# Patient Record
Sex: MALE | Race: WHITE | NOT HISPANIC OR LATINO | Employment: OTHER | URBAN - NONMETROPOLITAN AREA
[De-identification: names, ages, dates, MRNs, and addresses within clinical notes are randomized per-mention and may not be internally consistent; named-entity substitution may affect disease eponyms.]

---

## 2017-11-29 PROBLEM — Z87.442 PERSONAL HISTORY OF URINARY CALCULI: Status: ACTIVE | Noted: 2017-11-29

## 2017-11-29 PROBLEM — R33.9 URINARY RETENTION: Status: ACTIVE | Noted: 2017-11-29

## 2017-11-29 PROBLEM — R31.0 GROSS HEMATURIA: Status: ACTIVE | Noted: 2017-11-29

## 2020-08-11 PROBLEM — H25.812 COMBINED FORM OF AGE-RELATED CATARACT, LEFT EYE: Status: ACTIVE | Noted: 2020-08-11

## 2021-03-29 PROBLEM — H40.1131 PRIMARY OPEN ANGLE GLAUCOMA (POAG) OF BOTH EYES, MILD STAGE: Status: ACTIVE | Noted: 2021-03-29

## 2022-09-02 ENCOUNTER — HOSPITAL ENCOUNTER (EMERGENCY)
Facility: HOSPITAL | Age: 72
Discharge: HOME | End: 2022-09-02
Attending: STUDENT IN AN ORGANIZED HEALTH CARE EDUCATION/TRAINING PROGRAM
Payer: MEDICARE

## 2022-09-02 VITALS
DIASTOLIC BLOOD PRESSURE: 73 MMHG | TEMPERATURE: 97 F | HEIGHT: 64 IN | HEART RATE: 77 BPM | BODY MASS INDEX: 30.22 KG/M2 | SYSTOLIC BLOOD PRESSURE: 122 MMHG | WEIGHT: 177 LBS | RESPIRATION RATE: 16 BRPM | OXYGEN SATURATION: 96 %

## 2022-09-02 VITALS
SYSTOLIC BLOOD PRESSURE: 127 MMHG | HEART RATE: 95 BPM | TEMPERATURE: 100 F | WEIGHT: 177 LBS | RESPIRATION RATE: 18 BRPM | HEIGHT: 64 IN | BODY MASS INDEX: 30.22 KG/M2 | OXYGEN SATURATION: 97 % | DIASTOLIC BLOOD PRESSURE: 75 MMHG

## 2022-09-02 DIAGNOSIS — M54.9 BACK PAIN, UNSPECIFIED BACK LOCATION, UNSPECIFIED BACK PAIN LATERALITY, UNSPECIFIED CHRONICITY: Primary | ICD-10-CM

## 2022-09-02 PROCEDURE — 63600175 PHARM REV CODE 636 W HCPCS: Performed by: STUDENT IN AN ORGANIZED HEALTH CARE EDUCATION/TRAINING PROGRAM

## 2022-09-02 PROCEDURE — 99282 EMERGENCY DEPT VISIT SF MDM: CPT | Mod: 27

## 2022-09-02 PROCEDURE — 99284 EMERGENCY DEPT VISIT MOD MDM: CPT

## 2022-09-02 PROCEDURE — 96372 THER/PROPH/DIAG INJ SC/IM: CPT | Performed by: STUDENT IN AN ORGANIZED HEALTH CARE EDUCATION/TRAINING PROGRAM

## 2022-09-02 RX ORDER — MORPHINE SULFATE 2 MG/ML
2 INJECTION, SOLUTION INTRAMUSCULAR; INTRAVENOUS
Status: COMPLETED | OUTPATIENT
Start: 2022-09-02 | End: 2022-09-02

## 2022-09-02 RX ADMIN — MORPHINE SULFATE 2 MG: 2 INJECTION, SOLUTION INTRAMUSCULAR; INTRAVENOUS at 03:09

## 2022-09-02 NOTE — ED PROVIDER NOTES
"  History  Chief Complaint   Patient presents with    Back Pain     Pt with complaint of chronic back pain.     71-year-old male just seen in the ED presented again to his back pain started does reoccur once he got home.  Again patient with no bowel or bladder incontinence.  No saddle anesthesia.  No gait abnormality.  No systemic symptoms to indicate infection.      Past Medical History:   Diagnosis Date    Acute urinary retention     Back pain     Hematuria     High cholesterol     Hypertension     Renal stone     bladder cyst       Past Surgical History:   Procedure Laterality Date    CATARACT EXTRACTION      CATARACT EXTRACTION W/  INTRAOCULAR LENS IMPLANT Left 8/11/2020    Procedure: EXTRACTION, CATARACT, WITH IOL INSERTION;  Surgeon: Davy Celestin MD;  Location: [facility];  Service: Ophthalmology;  Laterality: Left;    dental extraction      SHOULDER SURGERY Left     spur       Family History   Problem Relation Age of Onset    Heart attack Mother     Cancer Father        Social History     Tobacco Use    Smoking status: Never    Smokeless tobacco: Never   Substance Use Topics    Alcohol use: No    Drug use: No       ROS  Review of Systems   Constitutional:  Negative for fever.   HENT:  Negative for congestion.    Eyes:  Negative for visual disturbance.   Respiratory:  Negative for cough and shortness of breath.    Cardiovascular:  Negative for chest pain.   Gastrointestinal:  Negative for abdominal pain, nausea and vomiting.   Genitourinary:  Negative for dysuria.   Musculoskeletal:  Positive for back pain. Negative for arthralgias.   Skin:  Negative for color change.   Allergic/Immunologic: Negative for immunocompromised state.   Neurological:  Negative for headaches.   Hematological:  Negative for adenopathy. Does not bruise/bleed easily.     Physical Exam  /73   Pulse 77   Temp 97 °F (36.1 °C) (Oral)   Resp 16   Ht 5' 4" (1.626 m)   Wt 80.3 kg (177 lb)   SpO2 96%   BMI 30.38 kg/m² "   Physical Exam    Constitutional: He appears well-developed and well-nourished. He is cooperative.   HENT:   Head: Normocephalic and atraumatic.   Eyes: Conjunctivae, EOM and lids are normal. Pupils are equal, round, and reactive to light.   Neck: Phonation normal.   Normal range of motion.  Cardiovascular:  Normal rate, regular rhythm and intact distal pulses.           Pulmonary/Chest: Breath sounds normal. No stridor. No respiratory distress.   Abdominal: Abdomen is soft. There is no abdominal tenderness.   Musculoskeletal:         General: Tenderness (back pain) present.      Cervical back: Normal range of motion.     Neurological: He is alert and oriented to person, place, and time.   Skin: Skin is warm and dry.   Psychiatric: He has a normal mood and affect. His speech is normal and behavior is normal.             Labs Reviewed - No data to display                       Procedures              MDM  Number of Diagnoses or Management Options  Back pain, unspecified back location, unspecified back pain laterality, unspecified chronicity  Diagnosis management comments: Patient informed that no additional medication given given this time given that he just received morphine few hours prior to arrival.  Patient advised to follow-up with orthopedic surgery in the outpatient setting for re-evaluation further care.  Return precautions are given.          Clinical Impression  The encounter diagnosis was Back pain, unspecified back location, unspecified back pain laterality, unspecified chronicity.       Baldev Daly MD  09/02/22 0508

## 2022-09-02 NOTE — ED PROVIDER NOTES
History  Chief Complaint   Patient presents with    Back Pain     Patient c/o bilateral lower back pain radiating to right side x 2 days and reports taking hydrocodone 1 hour PTA; Denies trauma/injury but states he might have hurt it while working in garden yesterday. Denies any urinary problems/symptoms     71-year-old male history of chronic back pain, hypertension hyperlipidemia who presents complaining of back pain.  Patient reports he has been experiencing chronic back pain over multiple months.  Symptoms acutely worsened over the last 2 days.  Patient states pain is noted to be right-sided with radiation towards the right flank which is typical for him.  Patient endorsing any ambulation difficulties, bowel or bladder incontinence, saddle anesthesia, or systemic symptoms such as fevers, chills, cough or congestion.  All other systemic symptoms reviewed are negative.  Patient did take hydrocodone 10 mg 1hr prior to arrival and did report relief however he is still experiencing pain.  She      Past Medical History:   Diagnosis Date    Acute urinary retention     Back pain     Hematuria     High cholesterol     Hypertension     Renal stone     bladder cyst       Past Surgical History:   Procedure Laterality Date    CATARACT EXTRACTION      CATARACT EXTRACTION W/  INTRAOCULAR LENS IMPLANT Left 8/11/2020    Procedure: EXTRACTION, CATARACT, WITH IOL INSERTION;  Surgeon: Davy Celestin MD;  Location: [facility];  Service: Ophthalmology;  Laterality: Left;    dental extraction      SHOULDER SURGERY Left     spur       Family History   Problem Relation Age of Onset    Heart attack Mother     Cancer Father        Social History     Tobacco Use    Smoking status: Never    Smokeless tobacco: Never   Substance Use Topics    Alcohol use: No    Drug use: No       ROS  Review of Systems   Constitutional:  Negative for fever.   HENT:  Negative for congestion.    Eyes:  Negative for visual disturbance.   Respiratory:   "Negative for cough and shortness of breath.    Cardiovascular:  Negative for chest pain.   Gastrointestinal:  Negative for abdominal pain, nausea and vomiting.   Genitourinary:  Negative for dysuria.   Musculoskeletal:  Positive for back pain. Negative for arthralgias.   Skin:  Negative for color change.   Allergic/Immunologic: Negative for immunocompromised state.   Neurological:  Negative for headaches.   Hematological:  Negative for adenopathy. Does not bruise/bleed easily.     Physical Exam  BP (!) 143/88   Pulse 99   Temp 99.5 °F (37.5 °C) (Oral)   Resp 18   Ht 5' 4" (1.626 m)   Wt 80.3 kg (177 lb)   SpO2 97%   BMI 30.38 kg/m²   Physical Exam    Constitutional: He appears well-developed and well-nourished. He is cooperative.   HENT:   Head: Normocephalic and atraumatic.   Eyes: Conjunctivae, EOM and lids are normal. Pupils are equal, round, and reactive to light.   Neck: Phonation normal.   Normal range of motion.  Cardiovascular:  Normal rate, regular rhythm and intact distal pulses.           Pulmonary/Chest: Breath sounds normal. No stridor.   Abdominal: Abdomen is soft. There is no abdominal tenderness.   Musculoskeletal:      Cervical back: Normal range of motion.        Back:      Neurological: He is alert and oriented to person, place, and time.   Skin: Skin is warm and dry.   Psychiatric: He has a normal mood and affect. His speech is normal and behavior is normal.             Labs Reviewed - No data to display                       Procedures              MDM  Number of Diagnoses or Management Options  Back pain, unspecified back location, unspecified back pain laterality, unspecified chronicity  Diagnosis management comments: 71-year-old male presenting with acute on chronic back pain.  No signs of cord compression identified.  No signs of systemic infection.  Patient did take medication prior to arrival, additional medication given in the ED with improvement.  Will discharge with advise to " follow-up with orthopedic surgery in the outpatient setting for MRI and further evaluation.  Return precautions were given          Clinical Impression  The encounter diagnosis was Back pain, unspecified back location, unspecified back pain laterality, unspecified chronicity.       Baldev Daly MD  09/02/22 0338

## 2024-04-01 PROBLEM — Z76.89 ENCOUNTER TO ESTABLISH CARE: Status: ACTIVE | Noted: 2024-04-01
